# Patient Record
Sex: MALE | Race: WHITE | NOT HISPANIC OR LATINO
[De-identification: names, ages, dates, MRNs, and addresses within clinical notes are randomized per-mention and may not be internally consistent; named-entity substitution may affect disease eponyms.]

---

## 2017-05-10 ENCOUNTER — APPOINTMENT (OUTPATIENT)
Dept: CARDIOLOGY | Facility: CLINIC | Age: 77
End: 2017-05-10

## 2017-05-23 ENCOUNTER — APPOINTMENT (OUTPATIENT)
Dept: CARDIOLOGY | Facility: CLINIC | Age: 77
End: 2017-05-23

## 2017-06-01 ENCOUNTER — APPOINTMENT (OUTPATIENT)
Dept: CARDIOLOGY | Facility: CLINIC | Age: 77
End: 2017-06-01

## 2017-10-04 ENCOUNTER — RECORD ABSTRACTING (OUTPATIENT)
Age: 77
End: 2017-10-04

## 2017-10-04 DIAGNOSIS — Z87.891 PERSONAL HISTORY OF NICOTINE DEPENDENCE: ICD-10-CM

## 2017-10-04 DIAGNOSIS — Z87.828 PERSONAL HISTORY OF OTHER (HEALED) PHYSICAL INJURY AND TRAUMA: ICD-10-CM

## 2017-10-04 DIAGNOSIS — Z86.79 PERSONAL HISTORY OF OTHER DISEASES OF THE CIRCULATORY SYSTEM: ICD-10-CM

## 2017-10-04 DIAGNOSIS — Z82.49 FAMILY HISTORY OF ISCHEMIC HEART DISEASE AND OTHER DISEASES OF THE CIRCULATORY SYSTEM: ICD-10-CM

## 2017-10-04 RX ORDER — CLOPIDOGREL BISULFATE 75 MG/1
75 TABLET, FILM COATED ORAL DAILY
Refills: 0 | Status: ACTIVE | COMMUNITY

## 2017-10-04 RX ORDER — ATORVASTATIN CALCIUM 20 MG/1
20 TABLET, FILM COATED ORAL DAILY
Refills: 0 | Status: ACTIVE | COMMUNITY

## 2017-11-06 ENCOUNTER — APPOINTMENT (OUTPATIENT)
Dept: CARDIOLOGY | Facility: CLINIC | Age: 77
End: 2017-11-06

## 2017-11-14 ENCOUNTER — APPOINTMENT (OUTPATIENT)
Dept: CARDIOLOGY | Facility: CLINIC | Age: 77
End: 2017-11-14
Payer: MEDICARE

## 2017-11-14 PROCEDURE — 93306 TTE W/DOPPLER COMPLETE: CPT

## 2017-11-14 PROCEDURE — 93224 XTRNL ECG REC UP TO 48 HRS: CPT

## 2017-11-29 ENCOUNTER — APPOINTMENT (OUTPATIENT)
Dept: CARDIOLOGY | Facility: CLINIC | Age: 77
End: 2017-11-29
Payer: MEDICARE

## 2017-11-29 VITALS
DIASTOLIC BLOOD PRESSURE: 60 MMHG | HEART RATE: 71 BPM | BODY MASS INDEX: 34.4 KG/M2 | SYSTOLIC BLOOD PRESSURE: 100 MMHG | HEIGHT: 72 IN | WEIGHT: 254 LBS | OXYGEN SATURATION: 94 %

## 2017-11-29 DIAGNOSIS — Z98.890 OTHER SPECIFIED POSTPROCEDURAL STATES: ICD-10-CM

## 2017-11-29 DIAGNOSIS — R60.0 LOCALIZED EDEMA: ICD-10-CM

## 2017-11-29 DIAGNOSIS — Z89.619 ACQUIRED ABSENCE OF UNSPECIFIED LEG ABOVE KNEE: ICD-10-CM

## 2017-11-29 PROCEDURE — 99214 OFFICE O/P EST MOD 30 MIN: CPT

## 2017-12-21 ENCOUNTER — APPOINTMENT (OUTPATIENT)
Dept: CARDIOLOGY | Facility: CLINIC | Age: 77
End: 2017-12-21

## 2018-05-30 ENCOUNTER — APPOINTMENT (OUTPATIENT)
Dept: CARDIOLOGY | Facility: CLINIC | Age: 78
End: 2018-05-30

## 2018-05-31 ENCOUNTER — APPOINTMENT (OUTPATIENT)
Dept: CARDIOLOGY | Facility: CLINIC | Age: 78
End: 2018-05-31

## 2018-08-07 ENCOUNTER — APPOINTMENT (OUTPATIENT)
Dept: CARDIOLOGY | Facility: CLINIC | Age: 78
End: 2018-08-07
Payer: MEDICARE

## 2018-08-07 ENCOUNTER — NON-APPOINTMENT (OUTPATIENT)
Age: 78
End: 2018-08-07

## 2018-08-07 VITALS
DIASTOLIC BLOOD PRESSURE: 60 MMHG | HEART RATE: 60 BPM | SYSTOLIC BLOOD PRESSURE: 110 MMHG | WEIGHT: 250 LBS | HEIGHT: 72 IN | BODY MASS INDEX: 33.86 KG/M2

## 2018-08-07 PROCEDURE — 93000 ELECTROCARDIOGRAM COMPLETE: CPT

## 2018-08-07 PROCEDURE — 99214 OFFICE O/P EST MOD 30 MIN: CPT

## 2018-08-07 RX ORDER — FUROSEMIDE 20 MG/1
20 TABLET ORAL TWICE DAILY
Refills: 0 | Status: DISCONTINUED | COMMUNITY
End: 2018-08-07

## 2018-08-09 ENCOUNTER — OTHER (OUTPATIENT)
Age: 78
End: 2018-08-09

## 2018-10-23 ENCOUNTER — APPOINTMENT (OUTPATIENT)
Dept: NEUROSURGERY | Facility: CLINIC | Age: 78
End: 2018-10-23
Payer: MEDICARE

## 2018-10-23 VITALS
DIASTOLIC BLOOD PRESSURE: 65 MMHG | HEART RATE: 70 BPM | HEIGHT: 72 IN | BODY MASS INDEX: 33.86 KG/M2 | SYSTOLIC BLOOD PRESSURE: 115 MMHG | WEIGHT: 250 LBS

## 2018-10-23 DIAGNOSIS — M54.12 RADICULOPATHY, CERVICAL REGION: ICD-10-CM

## 2018-10-23 DIAGNOSIS — M48.02 SPINAL STENOSIS, CERVICAL REGION: ICD-10-CM

## 2018-10-23 PROCEDURE — 99204 OFFICE O/P NEW MOD 45 MIN: CPT

## 2018-11-15 ENCOUNTER — APPOINTMENT (OUTPATIENT)
Dept: CARDIOLOGY | Facility: CLINIC | Age: 78
End: 2018-11-15

## 2019-08-15 ENCOUNTER — APPOINTMENT (OUTPATIENT)
Dept: CARDIOLOGY | Facility: CLINIC | Age: 79
End: 2019-08-15
Payer: MEDICARE

## 2019-08-15 ENCOUNTER — NON-APPOINTMENT (OUTPATIENT)
Age: 79
End: 2019-08-15

## 2019-08-15 VITALS
SYSTOLIC BLOOD PRESSURE: 100 MMHG | HEART RATE: 68 BPM | OXYGEN SATURATION: 90 % | BODY MASS INDEX: 33.86 KG/M2 | DIASTOLIC BLOOD PRESSURE: 62 MMHG | WEIGHT: 250 LBS | HEIGHT: 72 IN

## 2019-08-15 PROCEDURE — 99214 OFFICE O/P EST MOD 30 MIN: CPT

## 2019-08-15 PROCEDURE — 93000 ELECTROCARDIOGRAM COMPLETE: CPT

## 2019-08-15 RX ORDER — ATORVASTATIN CALCIUM 20 MG/1
20 TABLET, FILM COATED ORAL
Refills: 0 | Status: DISCONTINUED | COMMUNITY
End: 2019-08-15

## 2019-08-15 RX ORDER — LIDOCAINE 5% 700 MG/1
5 PATCH TOPICAL
Qty: 30 | Refills: 0 | Status: DISCONTINUED | COMMUNITY
Start: 2018-07-18 | End: 2019-08-15

## 2019-08-15 NOTE — ASSESSMENT
[FreeTextEntry1] : LUCRETIA BOOGIE is a 79 year old M who presents today Aug 15, 2019 with the above history and the following active issues: \par \par -Preop; remaining asymptomatic.  He is currently stable to proceed with his planned procedure. Continue Coreg am of the procedure. He may hold Plavix 5 days prior to his planned procedure and to resume as soon as possible postoperatively as directed by surgeon. Would prefer that he remain on ASA 81mg daily in the perioperative period. \par \par -CAD status post stent to the LAD (per patient 2009). EKG unchanged. Denies exertional complaints. Echo as above 11-14-17 Ef >65% grossly hyperdynamic left ventricular function. Patient is currently stable. Reviewed need for repeat ischemic evaluation which patient wants to discuss at next visit.\par \par -Peripheral vessel disease. Carotid ultrasound as above with mild obstructive disease. Currently stable and asx.\par \par -Hyperlipidemia on statin and requesting most recent lipid/hepatic/CK levels.\par \par He will establish care with Dr. Bullard at his next office visit in 6 months. \par He has been informed to call sooner for any new cardiac related symptoms. \par Any questions and concerns were addressed and resolved.\par \par Sincerely,\par \par BLAISE Feliz\par Patients history, testing, and plan reviewed with supervising MD: Dr. Simone Singh

## 2019-08-15 NOTE — REVIEW OF SYSTEMS
[see HPI] : see HPI [Negative] : Heme/Lymph [Shortness Of Breath] : no shortness of breath [Chest Pain] : no chest pain [Palpitations] : no palpitations [Dizziness] : no dizziness

## 2019-08-15 NOTE — ADDENDUM
[FreeTextEntry1] : Please note the patient was reviewed with the NP.\par I was physically present during the service of the patient\par I was directly involved in the management plan and recommendations of care provided to the patient. \par I personally reviewed the history and physical exam and plan as documented by the NP above.\par \par Simone Singh DO, FACC, RPVI\par Cardiologist\par 8/15/2019\par \par \par

## 2019-08-15 NOTE — HISTORY OF PRESENT ILLNESS
[Scheduled Procedure ___] : a [unfilled] [Preoperative Visit] : for a medical evaluation prior to surgery [Date of Surgery ___] : on [unfilled] [FreeTextEntry1] : \par Mr. Mart is a 79-year-old male that came in today 8-7-18 to get instructions for stopping his Plavix prior to his tooth extraction planned next week. \par \par He was last seen in our office Aug 2018 and since then there has been no illness or hospital stay. \par As you know he has a history of CAD s/p MI (as per past note; stent to the LAD in [place] per patient 2009). 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 \par He denies chest pain, sob, palpitations, dizziness, syncope, orthopnea, neurological events, bleeding and PND. \par \par Labs/tests\par \par EKG today 8/15/19 ordered and interpreted by me reveals normal sinus rhythm with LAFB which is unchanged.\par \par Echocardiogram 11-14-17 Ef > 65% with grossly hyperdynamic left ventricular systolic function sigmoid septum 1.4 cm, mild diastolic dysfunction, minimal MR, mild AR, mild LAE, minimal TR and mild NV. Aortic root 4.2 cm and ascending aorta 4.4 cm\par \par Carotid ultrasound 5-23-17 mild nonobstructive disease bilaterally

## 2020-02-19 ENCOUNTER — APPOINTMENT (OUTPATIENT)
Dept: CARDIOLOGY | Facility: CLINIC | Age: 80
End: 2020-02-19
Payer: MEDICARE

## 2020-02-19 VITALS
HEART RATE: 66 BPM | BODY MASS INDEX: 33.86 KG/M2 | DIASTOLIC BLOOD PRESSURE: 60 MMHG | HEIGHT: 72 IN | OXYGEN SATURATION: 94 % | SYSTOLIC BLOOD PRESSURE: 100 MMHG | WEIGHT: 250 LBS

## 2020-02-19 PROCEDURE — 99214 OFFICE O/P EST MOD 30 MIN: CPT

## 2020-02-19 RX ORDER — CARVEDILOL 12.5 MG/1
12.5 TABLET, FILM COATED ORAL
Refills: 0 | Status: ACTIVE | COMMUNITY

## 2020-02-19 NOTE — DISCUSSION/SUMMARY
[FreeTextEntry1] : LUCRETIA BOOGIE is a 80 year old M who presents today Aug 15, 2019 with the above history and the following active issues: \par \par -CAD status post stent to the LAD (per patient 2009). Obtain a nuclear stress test to evaluate for evidence of myocardial ischemia with h/o PCI and PAD.\par \par -Aortic Dilation: Obtain 2d echocardiogram to evaluate resting heart structure, valvular function, and LVEF.\par \par -Hyperlipidemia well controlled on statin \par \par Hypertension: Blood pressure well controlled. Patient to continue Carvedilol 12.5 mg, 1 tab in am, and 1/2 tab pm. \par Continue to monitor blood pressure at home. Goal systolic <130 \par \par Will call with testing results, follow up in 6 months.\par \par Sincerely,\par \par BENNY BaughC \par Patients history, testing, and plan reviewed with supervising MD: Dr. Simone Singh\par

## 2020-02-19 NOTE — PHYSICAL EXAM
[No Deformities] : no deformities [Normal Appearance] : normal appearance [Eyelids - No Xanthelasma] : the eyelids demonstrated no xanthelasmas [Normal Conjunctiva] : the conjunctiva exhibited no abnormalities [No Oral Pallor] : no oral pallor [Normal Oral Mucosa] : normal oral mucosa [No Oral Cyanosis] : no oral cyanosis [Respiration, Rhythm And Depth] : normal respiratory rhythm and effort [] : no respiratory distress [Exaggerated Use Of Accessory Muscles For Inspiration] : no accessory muscle use [Heart Sounds] : normal S1 and S2 [Heart Rate And Rhythm] : heart rate and rhythm were normal [Skin Color & Pigmentation] : normal skin color and pigmentation [Oriented To Time, Place, And Person] : oriented to person, place, and time [Mood] : the mood was normal [Affect] : the affect was normal [FreeTextEntry1] : right l/e edema (chronic per pt.), L BKA

## 2020-02-19 NOTE — REASON FOR VISIT
[Follow-Up - Clinic] : a clinic follow-up of [Coronary Artery Disease] : coronary artery disease [Hyperlipidemia] : hyperlipidemia [Hypertension] : hypertension [FreeTextEntry1] : Aorta Dilation/TAA

## 2020-02-19 NOTE — HISTORY OF PRESENT ILLNESS
[FreeTextEntry1] : This is a 80 year male with the below PMH CAD, PCI/STENT x 1 2009, DVT, TAA HTN, HLD, who presents to office for routine cardiovascular care. There has been no recent illness or hospitalization. \par \par He denies chest pain, pressure, palpitations, unusual shortness of breath, orthopnea, LE edema, lightheadedness, dizziness, near syncope or syncope.\par \par He does not engage in any physical activity/exercise, walks with a cane.\par No recent CV testing, ECHO, or ischemic evaluation.\par \par EKG  8/15/19 normal sinus rhythm with LAFB which is unchanged\par TAA ECHO 11/17: Aortic root 4.2 cm and ascending aorta 4.4 cm\par \par Historical medical history:\par As you know he has a history of CAD s/p MI (as per past note; stent to the LAD in [place] per patient 2009). 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 \par He denies chest pain, sob, palpitations, dizziness, syncope, orthopnea, neurological events, bleeding and PND. \par \par Labs/tests\par Echocardiogram 11-14-17 Ef > 65% with grossly hyperdynamic left ventricular systolic function sigmoid septum 1.4 cm, mild diastolic dysfunction, minimal MR, mild AR, mild LAE, minimal TR and mild IN. Aortic root 4.2 cm and ascending aorta 4.4 cm\par \par Carotid ultrasound 5-23-17 mild nonobstructive disease bilaterally

## 2020-02-19 NOTE — ADDENDUM
[FreeTextEntry1] : Please note the patient was reviewed with the NP.\par I was physically present during the service of the patient\par I was directly involved in the management plan and recommendations of care provided to the patient. \par I personally reviewed the history and physical exam and plan as documented by the NP above.\par \par Simone Singh DO, FACC, RPVI\par Cardiologist\par 02/19/2020\par \par

## 2020-04-13 ENCOUNTER — APPOINTMENT (OUTPATIENT)
Dept: CARDIOLOGY | Facility: CLINIC | Age: 80
End: 2020-04-13

## 2020-04-21 ENCOUNTER — APPOINTMENT (OUTPATIENT)
Dept: CARDIOLOGY | Facility: CLINIC | Age: 80
End: 2020-04-21
Payer: MEDICARE

## 2020-04-21 PROCEDURE — A9502: CPT

## 2020-04-21 PROCEDURE — 78452 HT MUSCLE IMAGE SPECT MULT: CPT

## 2020-04-21 PROCEDURE — 93306 TTE W/DOPPLER COMPLETE: CPT

## 2020-04-21 PROCEDURE — 93015 CV STRESS TEST SUPVJ I&R: CPT

## 2020-04-29 ENCOUNTER — APPOINTMENT (OUTPATIENT)
Dept: CARDIOLOGY | Facility: CLINIC | Age: 80
End: 2020-04-29

## 2020-08-19 ENCOUNTER — NON-APPOINTMENT (OUTPATIENT)
Age: 80
End: 2020-08-19

## 2020-08-19 ENCOUNTER — APPOINTMENT (OUTPATIENT)
Dept: CARDIOLOGY | Facility: CLINIC | Age: 80
End: 2020-08-19
Payer: MEDICARE

## 2020-08-19 VITALS
HEIGHT: 72 IN | TEMPERATURE: 96 F | WEIGHT: 260 LBS | SYSTOLIC BLOOD PRESSURE: 132 MMHG | OXYGEN SATURATION: 95 % | DIASTOLIC BLOOD PRESSURE: 80 MMHG | BODY MASS INDEX: 35.21 KG/M2 | HEART RATE: 57 BPM

## 2020-08-19 PROCEDURE — 93000 ELECTROCARDIOGRAM COMPLETE: CPT

## 2020-08-19 PROCEDURE — 99214 OFFICE O/P EST MOD 30 MIN: CPT | Mod: 25

## 2020-08-19 RX ORDER — CLOPIDOGREL 75 MG/1
75 TABLET, FILM COATED ORAL
Refills: 0 | Status: DISCONTINUED | COMMUNITY
End: 2020-08-19

## 2020-08-19 RX ORDER — HYDROCODONE BITARTRATE AND ACETAMINOPHEN 10; 325 MG/1; MG/1
10-325 TABLET ORAL
Qty: 90 | Refills: 0 | Status: DISCONTINUED | COMMUNITY
Start: 2019-04-17 | End: 2020-08-19

## 2020-08-19 NOTE — PHYSICAL EXAM
[Normal Appearance] : normal appearance [No Deformities] : no deformities [Normal Conjunctiva] : the conjunctiva exhibited no abnormalities [Eyelids - No Xanthelasma] : the eyelids demonstrated no xanthelasmas [Normal Oral Mucosa] : normal oral mucosa [No Oral Pallor] : no oral pallor [No Oral Cyanosis] : no oral cyanosis [] : no respiratory distress [Respiration, Rhythm And Depth] : normal respiratory rhythm and effort [Exaggerated Use Of Accessory Muscles For Inspiration] : no accessory muscle use [Heart Rate And Rhythm] : heart rate and rhythm were normal [Heart Sounds] : normal S1 and S2 [Skin Color & Pigmentation] : normal skin color and pigmentation [Oriented To Time, Place, And Person] : oriented to person, place, and time [Affect] : the affect was normal [Mood] : the mood was normal [FreeTextEntry1] : right l/e edema (chronic per pt.), L BKA

## 2020-08-19 NOTE — HISTORY OF PRESENT ILLNESS
[FreeTextEntry1] : This is a 80 year male with the below PMH CAD, PCI/STENT x 1 2009, DVT, TAA HTN, HLD, who presents to office for routine cardiovascular care. There has been no recent illness or hospitalization. \par \par He denies chest pain, pressure, palpitations, unusual shortness of breath, orthopnea, LE edema, lightheadedness, dizziness, near syncope or syncope.\par \par He does not engage in any physical activity/exercise, walks with a cane.\par No recent CV testing, ECHO, or ischemic evaluation.\par \par EKG  8/15/19 normal sinus rhythm with LAFB which is unchanged\par TAA ECHO 11/17: Aortic root 4.2 cm and ascending aorta 4.4 cm\par \par Historical medical history:\par As you know he has a history of CAD s/p MI (as per past note; stent to the LAD in [place] per patient 2009). 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 \par He denies chest pain, sob, palpitations, dizziness, syncope, orthopnea, neurological events, bleeding and PND. \par \par Labs/tests\par Echocardiogram 11-14-17 Ef > 65% with grossly hyperdynamic left ventricular systolic function sigmoid septum 1.4 cm, mild diastolic dysfunction, minimal MR, mild AR, mild LAE, minimal TR and mild VT. Aortic root 4.2 cm and ascending aorta 4.4 cm\par \par Carotid ultrasound 5-23-17 mild nonobstructive disease bilaterally

## 2020-08-19 NOTE — DISCUSSION/SUMMARY
[FreeTextEntry1] : -CAD status post stent to the LAD (per patient 2009). No ischemia noted on pharmacologic nuclear stress testing April 2020.  Continue Aspirin, statin, and beta blocker therapies. \par \par -Aortic Dilation: stable at 4.4cm on echocardiogram April 2020. Continue beta blocker. Periodic echo surveillance.\par \par -Hyperlipidemia well controlled on statin \par \par Hypertension: Blood pressure well controlled. Patient to continue Carvedilol 12.5 mg, 1 tab in am, and 1/2 tab pm. \par \par 
(2) well flexed

## 2020-08-19 NOTE — REVIEW OF SYSTEMS
[see HPI] : see HPI [Negative] : Endocrine [Chest Pain] : no chest pain [Shortness Of Breath] : no shortness of breath [Palpitations] : no palpitations [Dizziness] : no dizziness

## 2021-06-24 ENCOUNTER — APPOINTMENT (OUTPATIENT)
Dept: CARDIOLOGY | Facility: CLINIC | Age: 81
End: 2021-06-24
Payer: MEDICARE

## 2021-06-24 ENCOUNTER — NON-APPOINTMENT (OUTPATIENT)
Age: 81
End: 2021-06-24

## 2021-06-24 VITALS
SYSTOLIC BLOOD PRESSURE: 110 MMHG | HEIGHT: 72 IN | TEMPERATURE: 97.3 F | HEART RATE: 94 BPM | DIASTOLIC BLOOD PRESSURE: 70 MMHG | WEIGHT: 248 LBS | BODY MASS INDEX: 33.59 KG/M2 | OXYGEN SATURATION: 95 %

## 2021-06-24 PROCEDURE — 93000 ELECTROCARDIOGRAM COMPLETE: CPT

## 2021-06-24 PROCEDURE — 99215 OFFICE O/P EST HI 40 MIN: CPT | Mod: 25

## 2021-06-24 NOTE — DISCUSSION/SUMMARY
[FreeTextEntry1] : 1. CAD status post stent to the LAD (per patient 2009). No ischemia noted on pharmacologic nuclear stress testing April 2020. Continue Aspirin 81mg daily, atorvastatin 20mg daily, Coreg 12.5mg BID (high risk medication with no signs of toxicity). Given the recent onset of dyspnea on exertion, recommend repeating pharmacologic nuclear stress testing and echocardiogram. \par \par 2. Aortic Dilatation: stable at 4.4cm on echocardiogram April 2020. Continue beta blocker. Periodic echo surveillance.\par \par 3. HLD: goal LDL less than 70. Continue atorvastatin 20mg daily.\par \par 4. HTN: Goal BP less than 130/80. Continue Coreg 12.5mg BID. \par \par

## 2021-06-24 NOTE — PHYSICAL EXAM
[Normal] : moves all extremities, no focal deficits, normal speech [de-identified] : No JVD, no carotid artery bruits auscultated bilaterally [de-identified] : ambulates slowly with cane [de-identified] : left BKA (prosthetic leg)

## 2021-06-24 NOTE — REVIEW OF SYSTEMS
[Fever] : no fever [Chills] : no chills [Feeling Fatigued] : feeling fatigued [SOB] : shortness of breath [Dyspnea on exertion] : dyspnea during exertion [Chest Discomfort] : no chest discomfort [Lower Ext Edema] : lower extremity edema [Palpitations] : no palpitations [Cough] : no cough [Wheezing] : no wheezing [Joint Pain] : joint pain [Easy Bleeding] : no tendency for easy bleeding [Joint Stiffness] : joint stiffness [Easy Bruising] : no tendency for easy bruising [Negative] : Neurological

## 2021-06-24 NOTE — HISTORY OF PRESENT ILLNESS
[FreeTextEntry1] : 81 year old male with history of CAD, s/p MI and stent to the LAD in 2009 ([place]), osteomyelitis with left BKA, DVT was was on Coumadin and it was subsequently discontinued in favor of Aspirin and Plavix, HTN, HLD, dilatation of the aorta, presents with a 2-3 month history of dyspnea. Patient states walking up the driveway he gets SOB. There is no chest pain or pressure. No palpitations.

## 2021-07-26 ENCOUNTER — APPOINTMENT (OUTPATIENT)
Dept: CARDIOLOGY | Facility: CLINIC | Age: 81
End: 2021-07-26

## 2021-11-05 ENCOUNTER — APPOINTMENT (OUTPATIENT)
Dept: CARDIOLOGY | Facility: CLINIC | Age: 81
End: 2021-11-05
Payer: MEDICARE

## 2021-11-05 PROCEDURE — 93015 CV STRESS TEST SUPVJ I&R: CPT

## 2021-11-05 PROCEDURE — 93306 TTE W/DOPPLER COMPLETE: CPT

## 2021-11-05 PROCEDURE — A9502: CPT

## 2021-11-05 PROCEDURE — 78452 HT MUSCLE IMAGE SPECT MULT: CPT

## 2021-11-11 ENCOUNTER — APPOINTMENT (OUTPATIENT)
Dept: CARDIOLOGY | Facility: CLINIC | Age: 81
End: 2021-11-11
Payer: MEDICARE

## 2021-11-11 VITALS
OXYGEN SATURATION: 95 % | BODY MASS INDEX: 32.51 KG/M2 | SYSTOLIC BLOOD PRESSURE: 118 MMHG | WEIGHT: 240 LBS | HEIGHT: 72 IN | HEART RATE: 90 BPM | DIASTOLIC BLOOD PRESSURE: 66 MMHG

## 2021-11-11 DIAGNOSIS — R94.39 ABNORMAL RESULT OF OTHER CARDIOVASCULAR FUNCTION STUDY: ICD-10-CM

## 2021-11-11 DIAGNOSIS — I82.409 ACUTE EMBOLISM AND THROMBOSIS OF UNSPECIFIED DEEP VEINS OF UNSPECIFIED LOWER EXTREMITY: ICD-10-CM

## 2021-11-11 PROCEDURE — 99215 OFFICE O/P EST HI 40 MIN: CPT

## 2021-11-11 NOTE — DISCUSSION/SUMMARY
[FreeTextEntry1] : 1. CAD status post stent to the LAD (per patient 2009). Continue Aspirin 81mg daily, atorvastatin 20mg daily, Coreg 12.5mg BID (high risk medication with no signs of toxicity). Given the dyspnea on exertion and abnormal nuclear stress testing, recommend left heart catheterization for further evaluation. Discussed risks and benefits and patient wishes to proceed.\par \par 2. Aortic Dilatation: aortic root 4.4cm, ascending aorta 4.6cm. Overall stable.  Continue beta blocker. Periodic echo surveillance.\par \par 3. HLD: goal LDL less than 70. Continue atorvastatin 20mg daily.\par \par 4. HTN: Goal BP less than 130/80. Continue Coreg 12.5mg BID. \par \par Follow up after cardiac catheterization.\par \par

## 2021-11-11 NOTE — PHYSICAL EXAM
[Normal] : moves all extremities, no focal deficits, normal speech [de-identified] : No JVD, no carotid artery bruits auscultated bilaterally [de-identified] : ambulates slowly with cane [de-identified] : left BKA (prosthetic leg)

## 2021-11-11 NOTE — CARDIOLOGY SUMMARY
[de-identified] : 06/24/2021, RENETTA JACINTO [de-identified] : 11/5/2021, Pharmacologic Nuclear Stress Testing: SPECT images revealed inferoapical ischemia with associated wall motion. [de-identified] : 11/5/2021, LV EF 50%, MAC with mild MR, mild AI, aortic root 4.4cm, ascending aorta 4.6cm, mild TR with estimated PASP of 20mmHg.

## 2021-11-11 NOTE — REVIEW OF SYSTEMS
[Fever] : no fever [Chills] : no chills [Feeling Fatigued] : feeling fatigued [SOB] : shortness of breath [Dyspnea on exertion] : dyspnea during exertion [Chest Discomfort] : no chest discomfort [Lower Ext Edema] : lower extremity edema [Palpitations] : no palpitations [Cough] : no cough [Wheezing] : no wheezing [Joint Pain] : joint pain [Joint Stiffness] : joint stiffness [Easy Bleeding] : no tendency for easy bleeding [Easy Bruising] : no tendency for easy bruising [Negative] : Neurological

## 2021-11-12 ENCOUNTER — NON-APPOINTMENT (OUTPATIENT)
Age: 81
End: 2021-11-12

## 2021-11-13 ENCOUNTER — APPOINTMENT (OUTPATIENT)
Dept: DISASTER EMERGENCY | Facility: CLINIC | Age: 81
End: 2021-11-13

## 2021-11-13 ENCOUNTER — OUTPATIENT (OUTPATIENT)
Dept: OUTPATIENT SERVICES | Facility: HOSPITAL | Age: 81
LOS: 1 days | End: 2021-11-13

## 2021-11-13 DIAGNOSIS — Z01.818 ENCOUNTER FOR OTHER PREPROCEDURAL EXAMINATION: ICD-10-CM

## 2021-11-14 ENCOUNTER — NON-APPOINTMENT (OUTPATIENT)
Age: 81
End: 2021-11-14

## 2021-11-14 LAB — SARS-COV-2 N GENE NPH QL NAA+PROBE: NOT DETECTED

## 2021-11-16 ENCOUNTER — OUTPATIENT (OUTPATIENT)
Dept: OUTPATIENT SERVICES | Facility: HOSPITAL | Age: 81
LOS: 1 days | End: 2021-11-16
Payer: MEDICARE

## 2021-11-16 ENCOUNTER — NON-APPOINTMENT (OUTPATIENT)
Age: 81
End: 2021-11-16

## 2021-11-16 PROCEDURE — 93458 L HRT ARTERY/VENTRICLE ANGIO: CPT | Mod: 26

## 2021-11-16 PROCEDURE — 93010 ELECTROCARDIOGRAM REPORT: CPT

## 2021-11-19 ENCOUNTER — APPOINTMENT (OUTPATIENT)
Dept: CARDIOLOGY | Facility: CLINIC | Age: 81
End: 2021-11-19
Payer: MEDICARE

## 2021-11-19 VITALS
WEIGHT: 240 LBS | HEIGHT: 72 IN | DIASTOLIC BLOOD PRESSURE: 60 MMHG | TEMPERATURE: 98 F | OXYGEN SATURATION: 95 % | HEART RATE: 71 BPM | SYSTOLIC BLOOD PRESSURE: 104 MMHG | BODY MASS INDEX: 32.51 KG/M2

## 2021-11-19 DIAGNOSIS — R06.00 DYSPNEA, UNSPECIFIED: ICD-10-CM

## 2021-11-19 DIAGNOSIS — E66.9 OBESITY, UNSPECIFIED: ICD-10-CM

## 2021-11-19 PROCEDURE — 99214 OFFICE O/P EST MOD 30 MIN: CPT

## 2021-11-19 RX ORDER — MULTIVITAMIN
CAPSULE ORAL
Refills: 0 | Status: ACTIVE | COMMUNITY

## 2021-11-19 RX ORDER — PSYLLIUM HUSK 0.4 G
CAPSULE ORAL
Refills: 0 | Status: ACTIVE | COMMUNITY

## 2021-11-19 NOTE — HISTORY OF PRESENT ILLNESS
[FreeTextEntry1] : \par 81 year old male with history of CAD, s/p MI and stent to the prox/mid LAD in 2009 (cobalt chromium multi link 3.5x18 mm stent in [place]), osteomyelitis with left BKA, DVT was was on Coumadin and it was subsequently discontinued in favor of Aspirin and Plavix, HTN, HLD, dilatation of the aorta present for post cath follow up.\par \par cath for dyspnea on exertion. unrevealing. 20% lad isr and 20% rca. normal lvedp. right radial artery Access site without significant pain/tenderness, redness, swelling, bruising, full ROM, no bruit upon auscultation, and patent distal pulses.\par

## 2021-11-19 NOTE — REVIEW OF SYSTEMS
[Feeling Fatigued] : feeling fatigued [SOB] : shortness of breath [Dyspnea on exertion] : dyspnea during exertion [Lower Ext Edema] : lower extremity edema [Joint Pain] : joint pain [Joint Stiffness] : joint stiffness [Negative] : Neurological [Fever] : no fever [Chills] : no chills [Chest Discomfort] : no chest discomfort [Palpitations] : no palpitations [Cough] : no cough [Wheezing] : no wheezing [Easy Bleeding] : no tendency for easy bleeding [Easy Bruising] : no tendency for easy bruising

## 2021-11-19 NOTE — CARDIOLOGY SUMMARY
[de-identified] : 06/24/2021, RENETTA JACINTO [de-identified] : 11/5/2021, Pharmacologic Nuclear Stress Testing: SPECT images revealed inferoapical ischemia with associated wall motion. [de-identified] : 11/5/2021, LV EF 50%, MAC with mild MR, mild AI, aortic root 4.4cm, ascending aorta 4.6cm, mild TR with estimated PASP of 20mmHg.

## 2021-11-19 NOTE — PHYSICAL EXAM
[Normal] : moves all extremities, no focal deficits, normal speech [de-identified] : No JVD, no carotid artery bruits auscultated bilaterally [de-identified] : ambulates slowly with cane [de-identified] : left BKA (prosthetic leg)

## 2021-11-19 NOTE — DISCUSSION/SUMMARY
[FreeTextEntry1] : \par 81 year old male with history of CAD, s/p MI and stent to the prox/mid LAD in 2009 (cobalt chromium multi link 3.5x18 mm stent in [place]), osteomyelitis with left BKA, DVT was was on Coumadin and it was subsequently discontinued in favor of Aspirin and Plavix, HTN, HLD, dilatation of the aorta present for post cath follow up.\par \par cath for dyspnea on exertion. unrevealing. 20% lad isr and 20% rca. normal lvedp. right radial artery Access site without significant pain/tenderness, redness, swelling, bruising, full ROM, no bruit upon auscultation, and patent distal pulses.\par \par \par I referred to pulmonary. Continue current optimal medical therapy and follow up with primary cardiologist, Dr. Singh. TAA surveillance. Follow up with Dr. Singh as scheduled. Referral to Dr. Lizzie bains.\par \par \par

## 2022-02-22 ENCOUNTER — APPOINTMENT (OUTPATIENT)
Dept: RADIOLOGY | Facility: CLINIC | Age: 82
End: 2022-02-22
Payer: MEDICARE

## 2022-02-22 PROCEDURE — 71046 X-RAY EXAM CHEST 2 VIEWS: CPT

## 2022-02-25 ENCOUNTER — OUTPATIENT (OUTPATIENT)
Dept: OUTPATIENT SERVICES | Facility: HOSPITAL | Age: 82
LOS: 1 days | End: 2022-02-25

## 2022-02-25 DIAGNOSIS — R06.00 DYSPNEA, UNSPECIFIED: ICD-10-CM

## 2022-07-18 ENCOUNTER — APPOINTMENT (OUTPATIENT)
Dept: UROLOGY | Facility: CLINIC | Age: 82
End: 2022-07-18

## 2022-07-18 VITALS
DIASTOLIC BLOOD PRESSURE: 53 MMHG | HEIGHT: 72 IN | BODY MASS INDEX: 32.51 KG/M2 | TEMPERATURE: 97.9 F | WEIGHT: 240 LBS | HEART RATE: 73 BPM | SYSTOLIC BLOOD PRESSURE: 109 MMHG

## 2022-07-18 PROCEDURE — 99204 OFFICE O/P NEW MOD 45 MIN: CPT

## 2022-07-18 RX ORDER — SILODOSIN 8 MG/1
8 CAPSULE ORAL
Qty: 90 | Refills: 3 | Status: ACTIVE | COMMUNITY
Start: 2022-07-18 | End: 1900-01-01

## 2022-07-18 RX ORDER — DUTASTERIDE 0.5 MG/1
0.5 CAPSULE, LIQUID FILLED ORAL
Qty: 90 | Refills: 3 | Status: ACTIVE | COMMUNITY
Start: 2022-07-18 | End: 1900-01-01

## 2022-07-18 RX ORDER — TAMSULOSIN HYDROCHLORIDE 0.4 MG/1
0.4 CAPSULE ORAL DAILY
Refills: 0 | Status: DISCONTINUED | COMMUNITY
Start: 2019-07-02 | End: 2022-07-18

## 2022-07-18 NOTE — ASSESSMENT
[FreeTextEntry1] : Plan\par UA\par culture\par c/w silodosin 8mg\par start duatasteride\par renal bladder US\par fu 2 weeks

## 2022-07-18 NOTE — LETTER BODY
[Dear  ___] : Dear  [unfilled], [Consult Letter:] : I had the pleasure of evaluating your patient, [unfilled]. [Please see my note below.] : Please see my note below. [Consult Closing:] : Thank you very much for allowing me to participate in the care of this patient.  If you have any questions, please do not hesitate to contact me. [Sincerely,] : Sincerely, [FreeTextEntry3] : Jorge Rea, DO\par Genitourinary Medicine\par

## 2022-07-18 NOTE — HISTORY OF PRESENT ILLNESS
[FreeTextEntry1] : Mr. LUCRETIA BOOGIE 82 year old  M  PMH HLD, HTN, BPH, CAD s/p stents on plavix and PSH b/l pelvis. Pt comes in bc of frequent urination. Pt started on silodosin 8mg started 6 months ago. Pt having urinary urgency and leakage occasionally. Nocturia varies from 0-3. Urine flow medium to weak. \par

## 2022-07-20 ENCOUNTER — APPOINTMENT (OUTPATIENT)
Dept: ULTRASOUND IMAGING | Facility: CLINIC | Age: 82
End: 2022-07-20

## 2022-07-20 PROCEDURE — 76770 US EXAM ABDO BACK WALL COMP: CPT

## 2022-08-08 ENCOUNTER — APPOINTMENT (OUTPATIENT)
Dept: UROLOGY | Facility: CLINIC | Age: 82
End: 2022-08-08

## 2022-08-08 LAB
APPEARANCE: CLEAR
BACTERIA UR CULT: NORMAL
BACTERIA: NEGATIVE
BILIRUBIN URINE: NEGATIVE
BLOOD URINE: NEGATIVE
COLOR: YELLOW
GLUCOSE QUALITATIVE U: NEGATIVE
HYALINE CASTS: 0 /LPF
KETONES URINE: NEGATIVE
LEUKOCYTE ESTERASE URINE: NEGATIVE
MICROSCOPIC-UA: NORMAL
NITRITE URINE: NEGATIVE
PH URINE: 6
PROTEIN URINE: NEGATIVE
RED BLOOD CELLS URINE: 3 /HPF
SPECIFIC GRAVITY URINE: 1.02
SQUAMOUS EPITHELIAL CELLS: 1 /HPF
UROBILINOGEN URINE: ABNORMAL
WHITE BLOOD CELLS URINE: 2 /HPF

## 2022-08-22 ENCOUNTER — APPOINTMENT (OUTPATIENT)
Dept: UROLOGY | Facility: CLINIC | Age: 82
End: 2022-08-22

## 2022-08-22 VITALS
WEIGHT: 240 LBS | BODY MASS INDEX: 32.51 KG/M2 | TEMPERATURE: 98.2 F | DIASTOLIC BLOOD PRESSURE: 67 MMHG | SYSTOLIC BLOOD PRESSURE: 119 MMHG | HEART RATE: 67 BPM | HEIGHT: 72 IN

## 2022-08-22 DIAGNOSIS — N28.1 CYST OF KIDNEY, ACQUIRED: ICD-10-CM

## 2022-08-22 DIAGNOSIS — R39.9 UNSPECIFIED SYMPTOMS AND SIGNS INVOLVING THE GENITOURINARY SYSTEM: ICD-10-CM

## 2022-08-22 DIAGNOSIS — R31.29 OTHER MICROSCOPIC HEMATURIA: ICD-10-CM

## 2022-08-22 PROCEDURE — 99214 OFFICE O/P EST MOD 30 MIN: CPT

## 2022-08-22 NOTE — HISTORY OF PRESENT ILLNESS
[FreeTextEntry1] : Pt comes in follow up LUTS. Pt stopped silodosin bc pharmacist told him to stop. So currently just on dutasteride. Pt notices no difference in his urination. 
[email or web address]

## 2022-08-22 NOTE — ASSESSMENT
[FreeTextEntry1] : pt refuses cystoscopy\par \par Plan\par c/w dutasteride\par restart silodosin \par fu 1 month

## 2022-11-15 ENCOUNTER — NON-APPOINTMENT (OUTPATIENT)
Age: 82
End: 2022-11-15

## 2022-11-15 ENCOUNTER — APPOINTMENT (OUTPATIENT)
Dept: CARDIOLOGY | Facility: CLINIC | Age: 82
End: 2022-11-15

## 2022-11-15 VITALS
HEIGHT: 72 IN | DIASTOLIC BLOOD PRESSURE: 60 MMHG | SYSTOLIC BLOOD PRESSURE: 112 MMHG | HEART RATE: 88 BPM | OXYGEN SATURATION: 94 % | WEIGHT: 235 LBS | BODY MASS INDEX: 31.83 KG/M2

## 2022-11-15 DIAGNOSIS — I77.810 THORACIC AORTIC ECTASIA: ICD-10-CM

## 2022-11-15 DIAGNOSIS — I25.10 ATHEROSCLEROTIC HEART DISEASE OF NATIVE CORONARY ARTERY W/OUT ANGINA PECTORIS: ICD-10-CM

## 2022-11-15 DIAGNOSIS — Z79.899 OTHER LONG TERM (CURRENT) DRUG THERAPY: ICD-10-CM

## 2022-11-15 DIAGNOSIS — J43.9 EMPHYSEMA, UNSPECIFIED: ICD-10-CM

## 2022-11-15 DIAGNOSIS — I10 ESSENTIAL (PRIMARY) HYPERTENSION: ICD-10-CM

## 2022-11-15 DIAGNOSIS — E78.5 HYPERLIPIDEMIA, UNSPECIFIED: ICD-10-CM

## 2022-11-15 PROCEDURE — 99215 OFFICE O/P EST HI 40 MIN: CPT

## 2022-11-15 PROCEDURE — 93000 ELECTROCARDIOGRAM COMPLETE: CPT

## 2022-11-15 RX ORDER — ESCITALOPRAM OXALATE 10 MG/1
10 TABLET ORAL
Qty: 90 | Refills: 0 | Status: ACTIVE | COMMUNITY
Start: 2022-09-02

## 2022-11-15 RX ORDER — OXYCODONE AND ACETAMINOPHEN 5; 325 MG/1; MG/1
5-325 TABLET ORAL
Qty: 90 | Refills: 0 | Status: ACTIVE | COMMUNITY
Start: 2022-11-09

## 2022-11-15 NOTE — PHYSICAL EXAM
[Normal] : moves all extremities, no focal deficits, normal speech [de-identified] : No carotid artery bruits auscultated bilaterally [de-identified] : ambulates slowly with cane [de-identified] : left BKA (prosthetic leg)

## 2022-11-15 NOTE — DISCUSSION/SUMMARY
[FreeTextEntry1] : 1. CAD status post stent to the LAD (per patient 2009). Continue Aspirin 81mg daily, atorvastatin 20mg daily, Coreg 12.5mg BID (high risk medication with no signs of toxicity). Patient had cardiac catheterization in November 2021 and it demonstrated no obstructive disease. Normal LV EDP.\par \par 2. Aortic Dilatation: aortic root 4.4cm, ascending aorta 4.6cm. Overall stable.  Continue beta blocker. Periodic echo surveillance.\par \par 3. HLD: goal LDL less than 70. Continue atorvastatin 20mg daily.\par \par 4. HTN: Goal BP less than 130/80. Continue Coreg 12.5mg BID. \par \par Follow up in 6 months.\par \par  [EKG obtained to assist in diagnosis and management of assessed problem(s)] : EKG obtained to assist in diagnosis and management of assessed problem(s)

## 2022-11-15 NOTE — CARDIOLOGY SUMMARY
[de-identified] : 11/15/2022, RENETTA JACINTO [de-identified] : 11/5/2021, Pharmacologic Nuclear Stress Testing: SPECT images revealed inferoapical ischemia with associated wall motion. [de-identified] : 11/5/2021, LV EF 50%, MAC with mild MR, mild AI, aortic root 4.4cm, ascending aorta 4.6cm, mild TR with estimated PASP of 20mmHg. [de-identified] : 11/16/2021, Non-obstructive disease. Normal LVEDP.

## 2022-11-15 NOTE — HISTORY OF PRESENT ILLNESS
[FreeTextEntry1] : Historical Perspective:\par 82 year old male with history of CAD, s/p MI and stent to the LAD in 2009 ([place]), osteomyelitis with left BKA, DVT was was on Coumadin and it was subsequently discontinued in favor of Aspirin and Plavix, HTN, HLD, dilatation of the aorta, presents with a 2-3 month history of dyspnea. Patient states walking up the driveway he gets SOB. There is no chest pain or pressure. No palpitations. \par \par Current Health Status:\par Patient with no chest pain or pressure. Stable dyspnea on exertion. No palpitations. Tolerating medical therapy and reports no adverse effects.

## 2022-12-05 ENCOUNTER — APPOINTMENT (OUTPATIENT)
Dept: CARDIOLOGY | Facility: CLINIC | Age: 82
End: 2022-12-05

## 2022-12-05 PROCEDURE — 93306 TTE W/DOPPLER COMPLETE: CPT

## 2023-04-04 ENCOUNTER — RX ONLY (RX ONLY)
Age: 83
End: 2023-04-04

## 2023-04-04 ENCOUNTER — OFFICE (OUTPATIENT)
Dept: URBAN - METROPOLITAN AREA CLINIC 38 | Facility: CLINIC | Age: 83
Setting detail: OPHTHALMOLOGY
End: 2023-04-04
Payer: MEDICARE

## 2023-04-04 DIAGNOSIS — H16.223: ICD-10-CM

## 2023-04-04 DIAGNOSIS — H35.3132: ICD-10-CM

## 2023-04-04 DIAGNOSIS — Z96.1: ICD-10-CM

## 2023-04-04 PROCEDURE — 92012 INTRM OPH EXAM EST PATIENT: CPT | Performed by: OPTOMETRIST

## 2023-04-04 PROCEDURE — 92134 CPTRZ OPH DX IMG PST SGM RTA: CPT | Performed by: OPTOMETRIST

## 2023-04-04 ASSESSMENT — REFRACTION_CURRENTRX
OS_SPHERE: PLANO
OD_CYLINDER: -1.50
OD_OVR_VA: 20/
OD_OVR_VA: 20/
OD_VPRISM_DIRECTION: SV
OD_VPRISM_DIRECTION: SV
OS_CYLINDER: -0.75
OS_VPRISM_DIRECTION: SV
OS_AXIS: 112
OS_SPHERE: +2.75
OS_OVR_VA: 20/
OS_AXIS: 120
OD_SPHERE: PLANO
OD_AXIS: 063
OD_CYLINDER: -1.25
OD_AXIS: 056
OS_CYLINDER: -0.75
OD_SPHERE: PLANO
OS_OVR_VA: 20/

## 2023-04-04 ASSESSMENT — AXIALLENGTH_DERIVED
OD_AL: 23.8763
OD_AL: 23.6295

## 2023-04-04 ASSESSMENT — REFRACTION_MANIFEST
OD_SPHERE: +1.00
OD_CYLINDER: -1.25
OS_CYLINDER: -0.75
OS_AXIS: 115
OS_SPHERE: PLANO
OD_VA1: 20/400
OS_ADD: +3.00
OD_ADD: +3.00
OS_VA1: 20/40
OD_AXIS: 053

## 2023-04-04 ASSESSMENT — SUPERFICIAL PUNCTATE KERATITIS (SPK)
OD_SPK: T
OS_SPK: T

## 2023-04-04 ASSESSMENT — REFRACTION_AUTOREFRACTION
OS_SPHERE: PLANO
OD_AXIS: 053
OD_CYLINDER: -1.50
OS_AXIS: 115
OS_CYLINDER: -0.75
OD_SPHERE: +0.50

## 2023-04-04 ASSESSMENT — CONFRONTATIONAL VISUAL FIELD TEST (CVF)
OS_FINDINGS: FULL
OD_FINDINGS: FULL

## 2023-04-04 ASSESSMENT — TONOMETRY
OS_IOP_MMHG: 16
OD_IOP_MMHG: 14

## 2023-04-04 ASSESSMENT — SPHEQUIV_DERIVED
OD_SPHEQUIV: 0.375
OD_SPHEQUIV: -0.25

## 2023-04-04 ASSESSMENT — KERATOMETRY
METHOD_AUTO_MANUAL: AUTO
OS_K1POWER_DIOPTERS: 42.25
OD_K1POWER_DIOPTERS: 42.50
OD_AXISANGLE_DEGREES: 114
OS_AXISANGLE_DEGREES: 080
OD_K2POWER_DIOPTERS: 43.50
OS_K2POWER_DIOPTERS: 42.75

## 2023-04-04 ASSESSMENT — VISUAL ACUITY
OS_BCVA: 20/300
OD_BCVA: 20/40

## 2023-05-09 ENCOUNTER — APPOINTMENT (OUTPATIENT)
Dept: CARDIOLOGY | Facility: CLINIC | Age: 83
End: 2023-05-09

## 2023-11-22 ENCOUNTER — APPOINTMENT (OUTPATIENT)
Dept: RADIOLOGY | Facility: CLINIC | Age: 83
End: 2023-11-22